# Patient Record
Sex: FEMALE | Race: WHITE | HISPANIC OR LATINO | URBAN - METROPOLITAN AREA
[De-identification: names, ages, dates, MRNs, and addresses within clinical notes are randomized per-mention and may not be internally consistent; named-entity substitution may affect disease eponyms.]

---

## 2020-11-10 ENCOUNTER — TELEPHONE ENCOUNTER (OUTPATIENT)
Dept: URBAN - METROPOLITAN AREA CLINIC 92 | Facility: CLINIC | Age: 65
End: 2020-11-10

## 2020-11-10 RX ORDER — DEXLANSOPRAZOLE 60 MG/1
1 CAPSULE CAPSULE, DELAYED RELEASE ORAL
Qty: 90 | Refills: 1 | OUTPATIENT
Start: 2020-11-13

## 2021-08-05 ENCOUNTER — OFFICE VISIT (OUTPATIENT)
Dept: URBAN - METROPOLITAN AREA CLINIC 78 | Facility: CLINIC | Age: 66
End: 2021-08-05
Payer: COMMERCIAL

## 2021-08-05 VITALS
TEMPERATURE: 96.4 F | SYSTOLIC BLOOD PRESSURE: 127 MMHG | DIASTOLIC BLOOD PRESSURE: 80 MMHG | HEART RATE: 92 BPM | HEIGHT: 65 IN | BODY MASS INDEX: 28.19 KG/M2 | WEIGHT: 169.2 LBS

## 2021-08-05 DIAGNOSIS — F41.9 ANXIETY: ICD-10-CM

## 2021-08-05 DIAGNOSIS — K44.9 HIATAL HERNIA: ICD-10-CM

## 2021-08-05 DIAGNOSIS — R10.13 EPIGASTRIC BURNING SENSATION: ICD-10-CM

## 2021-08-05 DIAGNOSIS — D64.9 ANEMIA, UNSPECIFIED TYPE: ICD-10-CM

## 2021-08-05 DIAGNOSIS — K25.7 CHRONIC CAMERON LESION: ICD-10-CM

## 2021-08-05 PROCEDURE — 99214 OFFICE O/P EST MOD 30 MIN: CPT | Performed by: INTERNAL MEDICINE

## 2021-08-05 RX ORDER — ESCITALOPRAM 10 MG/1
1 TABLET TABLET, FILM COATED ORAL ONCE A DAY
Status: ACTIVE | COMMUNITY

## 2021-08-05 RX ORDER — ALPRAZOLAM 0.25 MG/1
1 TABLET AS NEEDED TABLET ORAL
Refills: 0 | Status: ACTIVE | COMMUNITY
Start: 1900-01-01

## 2021-08-05 RX ORDER — DEXLANSOPRAZOLE 60 MG/1
1 CAPSULE CAPSULE, DELAYED RELEASE ORAL
Qty: 90 | Refills: 2 | OUTPATIENT
Start: 2021-08-05

## 2021-08-05 RX ORDER — DEXLANSOPRAZOLE 60 MG/1
1 CAPSULE CAPSULE, DELAYED RELEASE ORAL ONCE A DAY
Qty: 90 CAPSULE | Refills: 1 | Status: ACTIVE | COMMUNITY
Start: 2020-11-13

## 2021-08-05 RX ORDER — SIMVASTATIN 40 MG/1
1 TABLET IN THE EVENING TABLET, FILM COATED ORAL ONCE A DAY
Refills: 0 | Status: ACTIVE | COMMUNITY
Start: 1900-01-01

## 2021-08-05 NOTE — HPI-TODAY'S VISIT:
I had last seen the patient in March 2020 on referral from Fouzia Gomez MD , for a follow up gastroenterology evaluation for anemia and following paraesophageal hernia repair.  A copy of this note will be sent to the referring physician.    The patient initially saw me as she had been told she had mild anemia (Hb of in 2018 went down to 3, and subsequently 6.2 again a few months after transfusion of pRBCs). She was instructed to start taking Fe pills which she forgot to do. She was feeling very fatigued. She was sent to the hosptital where she was transfused 3u pRBCs and also received iron infusions. I performed an EGD and colonoscopy on 8/8/18, and found her to have a 6-7cm HH with Sudeep erosions within the hernia sac, which likely explained her anemia. Biopsies were negative for H pylori or celiac sprue. She also had a 4 mm HP polyp removed from the colon.   Subsequently, a CT chest without contrast on 10/5/18 revealed a large hiatal hernia -with the gastric fundus and most of the gastric body seen within the end for a medial left hemithorax. I had referred her to Dr. Chuy Taylor. The patient underwent DaVinci robotic paraesophageal hernia repair in 10/22/19.   She has been having occasional epigastric burning sensation. She had been using Omeprazole 20mg daily; I recommended she use Dexilant daily which helped significantly more. The Dexilant is covered by her insurance but only if prescribed by a specialist. She had also noted that if she overeats her symptoms are worse. She denies heartburn per se. She did try stopping the Dexilant for a few days but noticed the burning again and decided to stay on the med. She denies abdominal pain. Appetite has been good. No unintentional weight loss. She denies any melena or hematochezia. She continues losing weight intentionally. She is exercising more often and avoiding late meals.    She has not had further anemia.   She taking Athletic Greens (MVI) which she getst at [employer].   She had a hysterectomy over 20 years ago. She suffers from panic attacks and anxiety.  Summary of prior wrokup: - Labs 9/2019: Normal CBC and CMP. - Labs on 6/17/19: WBC 6.4, Hb 12.7, MCV 78, plt 3412. Gluc 94, BUN 13, Cr 0.8, normal lytes, TP 6.8, Alb 4.1, TB 0.6, AP 84, AST 14, ALT 11. TSH 2.75. Fe 40, TIBC 471, % sat 8.  - CT chest without contrast on 10/5/18 revealed a large hiatal hernia (with the gastric fundus and most of the gastric body seen within the end for a medial left hemithorax), mild dilation of the ascending aorta. Follow-up imaging recommended. 7 mm liver lesions a small to characterize. The patient declined to undergo CT of the abdomen with contrast which had also been scheduled. - E/C by me on 8/8/18 revealed a normal esoph, 6cm HH with Sudeep lesions within the hernia sac, and mild pyloric channel stenosis which could be transversed with the scope. Colon was normal to the TI except for a 4 mm desc colon HP polyp and small IH's. Biopsies were negative for H pylori, celiac sprue, + fundic gland polyp.

## 2022-04-27 ENCOUNTER — TELEPHONE ENCOUNTER (OUTPATIENT)
Dept: URBAN - METROPOLITAN AREA CLINIC 78 | Facility: CLINIC | Age: 67
End: 2022-04-27

## 2022-05-02 ENCOUNTER — TELEPHONE ENCOUNTER (OUTPATIENT)
Dept: URBAN - METROPOLITAN AREA CLINIC 78 | Facility: CLINIC | Age: 67
End: 2022-05-02

## 2022-05-02 RX ORDER — DEXLANSOPRAZOLE 60 MG/1
1 CAPSULE CAPSULE, DELAYED RELEASE ORAL ONCE A DAY
Qty: 30 | Refills: 2 | OUTPATIENT
Start: 2022-05-02

## 2022-08-01 ENCOUNTER — TELEPHONE ENCOUNTER (OUTPATIENT)
Dept: URBAN - METROPOLITAN AREA CLINIC 78 | Facility: CLINIC | Age: 67
End: 2022-08-01

## 2022-08-01 RX ORDER — DEXLANSOPRAZOLE 60 MG/1
1 CAPSULE CAPSULE, DELAYED RELEASE ORAL ONCE A DAY
Qty: 30 | Refills: 2
Start: 2022-05-02

## 2022-10-06 ENCOUNTER — CLAIMS CREATED FROM THE CLAIM WINDOW (OUTPATIENT)
Dept: URBAN - METROPOLITAN AREA CLINIC 78 | Facility: CLINIC | Age: 67
End: 2022-10-06
Payer: COMMERCIAL

## 2022-10-06 VITALS
HEIGHT: 65 IN | WEIGHT: 176.4 LBS | RESPIRATION RATE: 16 BRPM | DIASTOLIC BLOOD PRESSURE: 84 MMHG | HEART RATE: 68 BPM | TEMPERATURE: 98.1 F | BODY MASS INDEX: 29.39 KG/M2 | SYSTOLIC BLOOD PRESSURE: 132 MMHG

## 2022-10-06 DIAGNOSIS — K25.7 CHRONIC CAMERON LESION: ICD-10-CM

## 2022-10-06 DIAGNOSIS — D64.9 ANEMIA, UNSPECIFIED TYPE: ICD-10-CM

## 2022-10-06 DIAGNOSIS — R10.13 EPIGASTRIC BURNING SENSATION: ICD-10-CM

## 2022-10-06 DIAGNOSIS — K44.9 HIATAL HERNIA: ICD-10-CM

## 2022-10-06 DIAGNOSIS — D50.8 ACQUIRED IRON DEFICIENCY ANEMIA DUE TO DECREASED ABSORPTION: ICD-10-CM

## 2022-10-06 DIAGNOSIS — F41.9 ANXIETY: ICD-10-CM

## 2022-10-06 PROBLEM — 1567007 CHRONIC GASTRIC ULCER WITHOUT HEMORRHAGE, WITHOUT PERFORATION AND WITHOUT OBSTRUCTION: Status: ACTIVE | Noted: 2022-10-06

## 2022-10-06 PROBLEM — 48694002: Status: ACTIVE | Noted: 2021-08-05

## 2022-10-06 PROCEDURE — 99214 OFFICE O/P EST MOD 30 MIN: CPT | Performed by: INTERNAL MEDICINE

## 2022-10-06 RX ORDER — ALPRAZOLAM 0.25 MG/1
1 TABLET AS NEEDED TABLET ORAL
Refills: 0 | Status: ACTIVE | COMMUNITY
Start: 1900-01-01

## 2022-10-06 RX ORDER — DEXLANSOPRAZOLE 60 MG/1
1 CAPSULE CAPSULE, DELAYED RELEASE ORAL
Qty: 90 | Refills: 2 | Status: ON HOLD | COMMUNITY
Start: 2021-08-05

## 2022-10-06 RX ORDER — SIMVASTATIN 40 MG/1
1 TABLET IN THE EVENING TABLET, FILM COATED ORAL ONCE A DAY
Refills: 0 | Status: ACTIVE | COMMUNITY
Start: 1900-01-01

## 2022-10-06 RX ORDER — DEXLANSOPRAZOLE 60 MG/1
1 CAPSULE CAPSULE, DELAYED RELEASE ORAL ONCE A DAY
Qty: 90 CAPSULE | Refills: 1 | Status: ACTIVE | COMMUNITY
Start: 2020-11-13

## 2022-10-06 RX ORDER — DEXLANSOPRAZOLE 60 MG/1
1 CAPSULE CAPSULE, DELAYED RELEASE ORAL ONCE A DAY
Qty: 30 | Refills: 2 | Status: ON HOLD | COMMUNITY
Start: 2022-05-02

## 2022-10-06 RX ORDER — DEXLANSOPRAZOLE 60 MG/1
1 CAPSULE CAPSULE, DELAYED RELEASE ORAL
Qty: 90 | Refills: 2 | OUTPATIENT

## 2022-10-06 RX ORDER — ESCITALOPRAM 10 MG/1
1 TABLET TABLET, FILM COATED ORAL ONCE A DAY
Status: ACTIVE | COMMUNITY

## 2022-10-06 NOTE — HPI-TODAY'S VISIT:
I had last seen the patient in March 2020 on referral from Lila Lazcano MD , for a follow up gastroenterology evaluation for anemia and following paraesophageal hernia repair.  A copy of this note will be sent to the referring physician.    The patient initially saw me as she had been told she had mild anemia (Hb of in 2018 went down to 3, and subsequently 6.2 again a few months after transfusion of pRBCs). She was instructed to start taking Fe pills which she forgot to do. She was feeling very fatigued. She was sent to the hosptital where she was transfused 3u pRBCs and also received iron infusions. I performed an EGD and colonoscopy on 8/8/18, and found her to have a 6-7cm HH with Sudeep erosions within the hernia sac, which likely explained her anemia. Biopsies were negative for H pylori or celiac sprue. She also had a 4 mm HP polyp removed from the colon.   Subsequently, a CT chest without contrast on 10/5/18 revealed a large hiatal hernia -with the gastric fundus and most of the gastric body seen within the end for a medial left hemithorax. I had referred her to Dr. Chuy Taylor. The patient underwent DaVinci robotic paraesophageal hernia repair in 10/22/19.   She had been having occasional epigastric burning sensation. She failed Omeprazole 20mg daily. She has been dign great on Dexilant daily. She had also noted that if she overeats her symptoms are worse. She denies heartburn per se. She did try stopping the Dexilant for a few days but noticed the burning again and decided to stay on the med. She denies abdominal pain. Appetite has been good. No unintentional weight loss. She denies any melena or hematochezia. She is exercising more often and avoiding late meals.    She taking Athletic Greens (MVI) which she getst at [employer]. This has helped with her constipation. She is now having 3-4 BMs/day.   No dysphagia, nausea or vomiting. No colorectal symptoms.   She has not had further anemia.   She had a hysterectomy over 20 years ago. She suffers from panic attacks and anxiety.  Summary of prior wrokup: - Labs 9/2019: Normal CBC and CMP. - Labs on 6/17/19: WBC 6.4, Hb 12.7, MCV 78, plt 3412. Gluc 94, BUN 13, Cr 0.8, normal lytes, TP 6.8, Alb 4.1, TB 0.6, AP 84, AST 14, ALT 11. TSH 2.75. Fe 40, TIBC 471, % sat 8.  - CT chest without contrast on 10/5/18 revealed a large hiatal hernia (with the gastric fundus and most of the gastric body seen within the end for a medial left hemithorax), mild dilation of the ascending aorta. Follow-up imaging recommended. 7 mm liver lesions a small to characterize. The patient declined to undergo CT of the abdomen with contrast which had also been scheduled. - E/C by me on 8/8/18 revealed a normal esoph, 6cm HH with Sudeep lesions within the hernia sac, and mild pyloric channel stenosis which could be transversed with the scope. Colon was normal to the TI except for a 4 mm desc colon HP polyp and small IH's. Biopsies were negative for H pylori, celiac sprue, + fundic gland polyp.

## 2023-10-25 ENCOUNTER — TELEPHONE ENCOUNTER (OUTPATIENT)
Dept: URBAN - METROPOLITAN AREA CLINIC 78 | Facility: CLINIC | Age: 68
End: 2023-10-25

## 2023-10-25 RX ORDER — DEXLANSOPRAZOLE 60 MG/1
1 CAPSULE CAPSULE, DELAYED RELEASE ORAL
Qty: 90 | Refills: 2

## 2024-01-08 ENCOUNTER — OFFICE VISIT (OUTPATIENT)
Dept: URBAN - METROPOLITAN AREA CLINIC 78 | Facility: CLINIC | Age: 69
End: 2024-01-08
Payer: COMMERCIAL

## 2024-01-08 ENCOUNTER — DASHBOARD ENCOUNTERS (OUTPATIENT)
Age: 69
End: 2024-01-08

## 2024-01-08 VITALS
SYSTOLIC BLOOD PRESSURE: 132 MMHG | WEIGHT: 175 LBS | HEART RATE: 81 BPM | BODY MASS INDEX: 29.16 KG/M2 | TEMPERATURE: 98.1 F | DIASTOLIC BLOOD PRESSURE: 90 MMHG | HEIGHT: 65 IN

## 2024-01-08 DIAGNOSIS — K25.7 CHRONIC CAMERON LESION: ICD-10-CM

## 2024-01-08 DIAGNOSIS — D50.0 ANEMIA: ICD-10-CM

## 2024-01-08 DIAGNOSIS — K44.9 HIATAL HERNIA: ICD-10-CM

## 2024-01-08 DIAGNOSIS — R10.13 EPIGASTRIC BURNING SENSATION: ICD-10-CM

## 2024-01-08 PROCEDURE — 99214 OFFICE O/P EST MOD 30 MIN: CPT | Performed by: INTERNAL MEDICINE

## 2024-01-08 RX ORDER — ESCITALOPRAM 10 MG/1
1 TABLET TABLET, FILM COATED ORAL ONCE A DAY
Status: ACTIVE | COMMUNITY

## 2024-01-08 RX ORDER — DEXLANSOPRAZOLE 60 MG/1
1 CAPSULE CAPSULE, DELAYED RELEASE ORAL
Qty: 90 | Refills: 2 | Status: ACTIVE | COMMUNITY

## 2024-01-08 RX ORDER — DEXLANSOPRAZOLE 60 MG/1
1 CAPSULE CAPSULE, DELAYED RELEASE ORAL ONCE A DAY
Qty: 90 CAPSULE | Refills: 1 | Status: ACTIVE | COMMUNITY
Start: 2020-11-13

## 2024-01-08 RX ORDER — ALPRAZOLAM 0.25 MG/1
1 TABLET AS NEEDED TABLET ORAL
Refills: 0 | Status: ON HOLD | COMMUNITY
Start: 1900-01-01

## 2024-01-08 RX ORDER — DEXLANSOPRAZOLE 60 MG/1
1 CAPSULE CAPSULE, DELAYED RELEASE ORAL
Qty: 90 | Refills: 2

## 2024-01-08 RX ORDER — SIMVASTATIN 40 MG/1
1 TABLET IN THE EVENING TABLET, FILM COATED ORAL ONCE A DAY
Refills: 0 | Status: ACTIVE | COMMUNITY
Start: 1900-01-01

## 2024-01-08 RX ORDER — DEXLANSOPRAZOLE 60 MG/1
1 CAPSULE CAPSULE, DELAYED RELEASE ORAL ONCE A DAY
Qty: 30 | Refills: 2 | Status: ON HOLD | COMMUNITY
Start: 2022-05-02

## 2024-01-08 NOTE — HPI-TODAY'S VISIT:
The patient comes in on referral from Lila Lazcano MD , for a follow up gastroenterology evaluation following paraesophageal hernia repair. A copy of this note will be sent to the referring physician.    The patient initially saw me in 2018 as she had been told she had mild anemia (Hb of in 2018 went down to 3, and subsequently 6.2 again a few months after transfusion of pRBCs). She was instructed to start taking Fe pills which she forgot to do. She was feeling very fatigued. She was sent to the hosptital where she was transfused 3u pRBCs and also received iron infusions. I performed an EGD and colonoscopy on 8/8/18, and found her to have a 6-7cm HH with Sudeep erosions within the hernia sac, which likely explained her anemia. Biopsies were negative for H pylori or celiac sprue. She also had a 4 mm HP polyp removed from the colon.   Subsequently, a CT chest without contrast on 10/5/18 revealed a large hiatal hernia -with the gastric fundus and most of the gastric body seen within the end for a medial left hemithorax. I had referred her to Dr. Chuy Taylor. The patient underwent DaVinci robotic paraesophageal hernia repair in 10/22/19.   She had been having occasional epigastric burning sensation. She has been doign great on Dexilant daily. She had also noted that if she overeats her symptoms are worse. She denies heartburn per se. She has tried stopping the Dexilant for a few days at a time but noticed the burning again and decided to stay on the med. She denies abdominal pain. Appetite has been good. No unintentional weight loss. She denies any melena or hematochezia.   She taking Athletic Greens (MVI) which she getst at [employer]. This has helped with her constipation. She is now having 3-4 BMs/day.   No dysphagia, nausea or vomiting. No colorectal symptoms.   She has not had further anemia.   She had a hysterectomy over 20 years ago. She suffers from panic attacks and anxiety.  Overall she is doing great and gave me an excellent update on her son, daughhter and granddaughters today.   Summary of prior wrokup: - Labs 9/2019: Normal CBC and CMP. - Labs on 6/17/19: WBC 6.4, Hb 12.7, MCV 78, plt 3412. Gluc 94, BUN 13, Cr 0.8, normal lytes, TP 6.8, Alb 4.1, TB 0.6, AP 84, AST 14, ALT 11. TSH 2.75. Fe 40, TIBC 471, % sat 8.  - CT chest without contrast on 10/5/18 revealed a large hiatal hernia (with the gastric fundus and most of the gastric body seen within the end for a medial left hemithorax), mild dilation of the ascending aorta. Follow-up imaging recommended. 7 mm liver lesions a small to characterize. The patient declined to undergo CT of the abdomen with contrast which had also been scheduled. - E/C by me on 8/8/18 revealed a normal esoph, 6cm HH with Sudeep lesions within the hernia sac, and mild pyloric channel stenosis which could be transversed with the scope. Colon was normal to the TI except for a 4 mm desc colon HP polyp and small IH's. Biopsies were negative for H pylori, celiac sprue, + fundic gland polyp.

## 2024-12-03 ENCOUNTER — OFFICE VISIT (OUTPATIENT)
Dept: URBAN - METROPOLITAN AREA CLINIC 78 | Facility: CLINIC | Age: 69
End: 2024-12-03

## 2024-12-03 ENCOUNTER — OFFICE VISIT (OUTPATIENT)
Dept: URBAN - METROPOLITAN AREA CLINIC 78 | Facility: CLINIC | Age: 69
End: 2024-12-03
Payer: COMMERCIAL

## 2024-12-03 VITALS
HEIGHT: 65 IN | WEIGHT: 172 LBS | TEMPERATURE: 98 F | HEART RATE: 90 BPM | SYSTOLIC BLOOD PRESSURE: 124 MMHG | BODY MASS INDEX: 28.66 KG/M2 | DIASTOLIC BLOOD PRESSURE: 81 MMHG

## 2024-12-03 DIAGNOSIS — K25.7 CHRONIC CAMERON LESION: ICD-10-CM

## 2024-12-03 DIAGNOSIS — F41.9 ANXIETY: ICD-10-CM

## 2024-12-03 DIAGNOSIS — R10.13 EPIGASTRIC BURNING SENSATION: ICD-10-CM

## 2024-12-03 DIAGNOSIS — D64.9 ANEMIA, UNSPECIFIED TYPE: ICD-10-CM

## 2024-12-03 DIAGNOSIS — K44.9 HIATAL HERNIA: ICD-10-CM

## 2024-12-03 PROCEDURE — 99214 OFFICE O/P EST MOD 30 MIN: CPT | Performed by: INTERNAL MEDICINE

## 2024-12-03 RX ORDER — DEXLANSOPRAZOLE 60 MG/1
1 CAPSULE CAPSULE, DELAYED RELEASE ORAL
Qty: 90 | Refills: 2 | Status: ACTIVE | COMMUNITY

## 2024-12-03 RX ORDER — ESCITALOPRAM 10 MG/1
1 TABLET TABLET, FILM COATED ORAL ONCE A DAY
Status: ACTIVE | COMMUNITY

## 2024-12-03 RX ORDER — DEXLANSOPRAZOLE 60 MG/1
1 CAPSULE CAPSULE, DELAYED RELEASE ORAL ONCE A DAY
Qty: 90 CAPSULE | Refills: 1 | Status: ACTIVE | COMMUNITY
Start: 2020-11-13

## 2024-12-03 RX ORDER — DEXLANSOPRAZOLE 60 MG/1
1 CAPSULE CAPSULE, DELAYED RELEASE ORAL ONCE A DAY
Qty: 30 | Refills: 2 | Status: ON HOLD | COMMUNITY
Start: 2022-05-02

## 2024-12-03 RX ORDER — ALPRAZOLAM 0.25 MG/1
1 TABLET AS NEEDED TABLET ORAL
Refills: 0 | Status: ON HOLD | COMMUNITY
Start: 1900-01-01

## 2024-12-03 RX ORDER — DEXLANSOPRAZOLE 60 MG/1
1 CAPSULE CAPSULE, DELAYED RELEASE ORAL
Qty: 90 | Refills: 2

## 2024-12-03 RX ORDER — SIMVASTATIN 40 MG/1
1 TABLET IN THE EVENING TABLET, FILM COATED ORAL ONCE A DAY
Refills: 0 | Status: ACTIVE | COMMUNITY
Start: 1900-01-01

## 2024-12-03 NOTE — HPI-TODAY'S VISIT:
The patient comes in on referral from Lila Lazcano MD , for a follow up gastroenterology evaluation following paraesophageal hernia repair. A copy of this note will be sent to the referring physician.    The patient initially saw me in 2018 as she had been told she had mild anemia (Hb of in 2018 went down to 3, and subsequently 6.2 again a few months after transfusion of pRBCs). She was instructed to start taking Fe pills which she forgot to do. She was feeling very fatigued. She was sent to the hosptital where she was transfused 3u pRBCs and also received iron infusions. I performed an EGD and colonoscopy on 8/8/18, and found her to have a 6-7cm HH with Sudeep erosions within the hernia sac, which likely explained her anemia. Biopsies were negative for H pylori or celiac sprue. She also had a 4 mm HP polyp removed from the colon.   Subsequently, a CT chest without contrast on 10/5/18 revealed a large hiatal hernia -with the gastric fundus and most of the gastric body seen within the end for a medial left hemithorax. I had referred her to Dr. Chuy Taylor. The patient underwent DaVinci robotic paraesophageal hernia repair in 10/22/19.   She has been doing great on Dexilant daily. She had also noted that if she overeats her symptoms are worse. She denies heartburn per se. She denies abdominal pain. Appetite has been good. No unintentional weight loss. She denies any melena or hematochezia.   She taking Athletic Greens (MVI) which she getst at [employer]. This has helped with her constipation. She is now having 3-4 BMs/day.   No dysphagia, nausea or vomiting. No colorectal symptoms.   She has not had further anemia.   She had a hysterectomy over 20 years ago. She suffers from panic attacks and anxiety.  Overall she is doing great and gave me an excellent update on her son, truong and granddaughters today. Two of her grandauthers are in a private university in [place]. The youngest is a kassandra in .  She was recenty diagnosed with asthma.  Summary of prior wrokup: - Labs 9/2019: Normal CBC and CMP. - Labs on 6/17/19: WBC 6.4, Hb 12.7, MCV 78, plt 3412. Gluc 94, BUN 13, Cr 0.8, normal lytes, TP 6.8, Alb 4.1, TB 0.6, AP 84, AST 14, ALT 11. TSH 2.75. Fe 40, TIBC 471, % sat 8.  - CT chest without contrast on 10/5/18 revealed a large hiatal hernia (with the gastric fundus and most of the gastric body seen within the end for a medial left hemithorax), mild dilation of the ascending aorta. Follow-up imaging recommended. 7 mm liver lesions a small to characterize. The patient declined to undergo CT of the abdomen with contrast which had also been scheduled. - E/C by me on 8/8/18 revealed a normal esoph, 6cm HH with Sudeep lesions within the hernia sac, and mild pyloric channel stenosis which could be transversed with the scope. Colon was normal to the TI except for a 4 mm desc colon HP polyp and small IH's. Biopsies were negative for H pylori, celiac sprue, + fundic gland polyp.